# Patient Record
Sex: FEMALE | Race: WHITE | NOT HISPANIC OR LATINO | Employment: OTHER | URBAN - METROPOLITAN AREA
[De-identification: names, ages, dates, MRNs, and addresses within clinical notes are randomized per-mention and may not be internally consistent; named-entity substitution may affect disease eponyms.]

---

## 2023-01-13 ENCOUNTER — TRANSFERRED RECORDS (OUTPATIENT)
Dept: HEALTH INFORMATION MANAGEMENT | Facility: CLINIC | Age: 33
End: 2023-01-13

## 2023-08-07 ENCOUNTER — TRANSFERRED RECORDS (OUTPATIENT)
Dept: HEALTH INFORMATION MANAGEMENT | Facility: CLINIC | Age: 33
End: 2023-08-07

## 2024-04-10 ENCOUNTER — TRANSFERRED RECORDS (OUTPATIENT)
Dept: HEALTH INFORMATION MANAGEMENT | Facility: CLINIC | Age: 34
End: 2024-04-10

## 2024-04-25 ENCOUNTER — TRANSFERRED RECORDS (OUTPATIENT)
Dept: HEALTH INFORMATION MANAGEMENT | Facility: CLINIC | Age: 34
End: 2024-04-25

## 2024-04-30 ENCOUNTER — MEDICAL CORRESPONDENCE (OUTPATIENT)
Dept: HEALTH INFORMATION MANAGEMENT | Facility: CLINIC | Age: 34
End: 2024-04-30

## 2024-05-03 ENCOUNTER — TELEPHONE (OUTPATIENT)
Dept: NEUROLOGY | Facility: CLINIC | Age: 34
End: 2024-05-03

## 2024-05-03 NOTE — TELEPHONE ENCOUNTER
New seizure, lives in [place], DR luis Bob   - c/w Prozac 40 mg qday  - c/w Zyprexa 7.5 mg at bedtime (increased on 11/29)  - c/w Klonopin 0.5 mg only at bedtime for anxiety

## 2024-09-04 ENCOUNTER — TRANSFERRED RECORDS (OUTPATIENT)
Dept: HEALTH INFORMATION MANAGEMENT | Facility: CLINIC | Age: 34
End: 2024-09-04
Payer: COMMERCIAL

## 2024-09-04 ENCOUNTER — OFFICE VISIT (OUTPATIENT)
Dept: NEUROLOGY | Facility: CLINIC | Age: 34
End: 2024-09-04
Payer: COMMERCIAL

## 2024-09-04 ENCOUNTER — ANCILLARY PROCEDURE (OUTPATIENT)
Dept: NEUROLOGY | Facility: CLINIC | Age: 34
End: 2024-09-04
Attending: PSYCHIATRY & NEUROLOGY
Payer: COMMERCIAL

## 2024-09-04 VITALS
HEIGHT: 70 IN | HEART RATE: 76 BPM | TEMPERATURE: 97.3 F | BODY MASS INDEX: 16.69 KG/M2 | DIASTOLIC BLOOD PRESSURE: 66 MMHG | OXYGEN SATURATION: 99 % | WEIGHT: 116.6 LBS | SYSTOLIC BLOOD PRESSURE: 99 MMHG

## 2024-09-04 DIAGNOSIS — R40.4 NONSPECIFIC PAROXYSMAL SPELL: Primary | ICD-10-CM

## 2024-09-04 DIAGNOSIS — G40.909 SEIZURE DISORDER (H): ICD-10-CM

## 2024-09-04 RX ORDER — HYDROXYZINE HYDROCHLORIDE 25 MG/1
25 TABLET, FILM COATED ORAL EVERY 8 HOURS PRN
COMMUNITY

## 2024-09-04 RX ORDER — DIVALPROEX SODIUM 250 MG/1
250 TABLET, DELAYED RELEASE ORAL AT BEDTIME
Status: ON HOLD | COMMUNITY
Start: 2024-06-17 | End: 2024-09-11

## 2024-09-04 RX ORDER — ESZOPICLONE 3 MG/1
3 TABLET, FILM COATED ORAL AT BEDTIME
COMMUNITY

## 2024-09-04 RX ORDER — LACOSAMIDE 50 MG/1
TABLET ORAL
Status: ON HOLD | COMMUNITY
Start: 2024-08-23 | End: 2024-09-10

## 2024-09-04 RX ORDER — VILAZODONE HYDROCHLORIDE 40 MG/1
40 TABLET ORAL
COMMUNITY

## 2024-09-04 RX ORDER — BUSPIRONE HYDROCHLORIDE 15 MG/1
15 TABLET ORAL 2 TIMES DAILY
COMMUNITY

## 2024-09-04 RX ORDER — CLONAZEPAM 0.5 MG/1
0.5 TABLET ORAL
COMMUNITY
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
## 2024-09-04 NOTE — PATIENT INSTRUCTIONS
Continue current medications.    Proceed with MRI today, Education tomorrow, and Inpatient evaluation on Thursday    Follow up with one of our epileptologists after the inpatient stay.    No driving at this time  Avoid any activities that might lead to self-injury or injury of others, within 3 months following any seizure with impaired awareness or impaired motor control such activities include but are not limited to operating power tools, operating firearms, climbing ladders/trees/exposure to heights from which he might fall. Do not operate power tools or heavy machinery and equipment.  Do not swim alone, bathe in any form of tub, such as bathtub, jacuzzi, or hot tub unless there is a responsible adult close by to provide assistance in case she has a seizure and drowns. Avoid work on hot surfaces such stoves, ovens, or with scalding hot water.

## 2024-09-05 ENCOUNTER — ALLIED HEALTH/NURSE VISIT (OUTPATIENT)
Dept: NEUROLOGY | Facility: CLINIC | Age: 34
End: 2024-09-05
Payer: COMMERCIAL

## 2024-09-05 DIAGNOSIS — R56.9 SEIZURE-LIKE ACTIVITY (H): Primary | ICD-10-CM

## 2024-09-05 NOTE — PROGRESS NOTES
Care Coordinator Visit    Name:  Winnie Banks   Date:    2024   :   1990   MRN:  [id number]     Time Spent:  Face-to-face time 90 minutes  See scanned [employer] checklist regarding videos viewed and handouts provided.     Winnie Banks comes into clinic today at the request of Claire Gunn PA-C Ordering and supervising Provider for Patient education    I met with the patient after seizure videos were viewed, as listed in the checklist.     Patient History:      Cecil is a 34 year old female referred by Dr.Jeanne Bob out of [facility] for evaluation of spells that have not been controlled with antiseizure medications.  Patient history includes possible convulsions starting a few weeks following a Gardasil vaccination, and continuing over the next year. She then reports seizure freedom, and no antiseizure medications for many years until a few years ago.  Seizure like events reoccurred, and have not been controlled since. She also reports a diagnosis of POTS, made following a tilt table study, and a diagnosis of Neurocardiogenic Syncope.  She currently lives with her  and their three year old child. 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    The  What Do You Know About Epilepsy  questionnaire was reviewed with the patient.  Areas focused on were what to do if a medication dose is missed.  We also reviewed all questions that were answered incorrectly.    Basic introduction to epilepsy was done, including the difference between epileptic and nonepileptic events (including physiologic and psychogenic nonepileptic events) and the difference between partial onset and generalized onset epileptic seizures.  We discussed appropriate treatment for each of  these and discussed why an accurate diagnosis is important.      We discussed stress and illness and how these can affect seizure control.  Progressive muscle relaxation was introduced and Cecil was provided with printed handouts about PMR    Cecil reported that she and her  would like to have another child, however due to the medications she is on she has been told this is advised against.  We discussed the risk of medications vs the risk of uncontrolled seizures. We talked about the importance of planning any pregnancy.    Introduction to [facility] was performed, including hospital policies, how seizures are induced, hygiene breaks, and the importance of staff assistance whenever  she  gets out of bed.  Winnie is aware that the length of stay is generally five days, but that this is dependent on what is captured on EEG.     During our general education session Winnie asked many appropriate questions, which were answered to  her satisfaction.

## 2024-09-06 ENCOUNTER — HOSPITAL ENCOUNTER (INPATIENT)
Facility: CLINIC | Age: 34
LOS: 5 days | Discharge: HOME | DRG: 101 | End: 2024-09-11
Attending: PSYCHIATRY & NEUROLOGY | Admitting: PSYCHIATRY & NEUROLOGY
Payer: COMMERCIAL

## 2024-09-06 ENCOUNTER — HOSPITAL ENCOUNTER (OUTPATIENT)
Dept: NEUROLOGY | Facility: CLINIC | Age: 34
Discharge: HOME | DRG: 101 | End: 2024-09-06
Attending: PSYCHIATRY & NEUROLOGY | Admitting: PSYCHIATRY & NEUROLOGY
Payer: COMMERCIAL

## 2024-09-06 DIAGNOSIS — R56.9 SEIZURES (H): Primary | ICD-10-CM

## 2024-09-06 LAB
ALBUMIN SERPL BCG-MCNC: 4 G/DL (ref 3.5–5.2)
ALP SERPL-CCNC: 29 U/L (ref 40–150)
ALT SERPL W P-5'-P-CCNC: 6 U/L (ref 0–50)
ANION GAP SERPL CALCULATED.3IONS-SCNC: 7 MMOL/L (ref 7–15)
AST SERPL W P-5'-P-CCNC: 25 U/L (ref 0–45)
BILIRUB SERPL-MCNC: 1.2 MG/DL
BUN SERPL-MCNC: 4.4 MG/DL (ref 6–20)
CALCIUM SERPL-MCNC: 8.8 MG/DL (ref 8.8–10.4)
CHLORIDE SERPL-SCNC: 107 MMOL/L (ref 98–107)
CREAT SERPL-MCNC: 0.79 MG/DL (ref 0.51–0.95)
EGFRCR SERPLBLD CKD-EPI 2021: >90 ML/MIN/1.73M2
ERYTHROCYTE [DISTWIDTH] IN BLOOD BY AUTOMATED COUNT: 13.2 % (ref 10–15)
GLUCOSE SERPL-MCNC: 86 MG/DL (ref 70–99)
HCG INTACT+B SERPL-ACNC: <1 MIU/ML
HCO3 SERPL-SCNC: 26 MMOL/L (ref 22–29)
HCT VFR BLD AUTO: 35.5 % (ref 35–47)
HGB BLD-MCNC: 11.4 G/DL (ref 11.7–15.7)
MCH RBC QN AUTO: 31.3 PG (ref 26.5–33)
MCHC RBC AUTO-ENTMCNC: 32.1 G/DL (ref 31.5–36.5)
MCV RBC AUTO: 98 FL (ref 78–100)
PLATELET # BLD AUTO: 117 10E3/UL (ref 150–450)
POTASSIUM SERPL-SCNC: 4.1 MMOL/L (ref 3.4–5.3)
PROT SERPL-MCNC: 6.1 G/DL (ref 6.4–8.3)
RBC # BLD AUTO: 3.64 10E6/UL (ref 3.8–5.2)
SODIUM SERPL-SCNC: 140 MMOL/L (ref 135–145)
WBC # BLD AUTO: 3.6 10E3/UL (ref 4–11)

## 2024-09-06 PROCEDURE — 99223 1ST HOSP IP/OBS HIGH 75: CPT | Mod: 25 | Performed by: PHYSICIAN ASSISTANT

## 2024-09-06 PROCEDURE — 84702 CHORIONIC GONADOTROPIN TEST: CPT | Performed by: PHYSICIAN ASSISTANT

## 2024-09-06 PROCEDURE — 250N000013 HC RX MED GY IP 250 OP 250 PS 637: Performed by: PHYSICIAN ASSISTANT

## 2024-09-06 PROCEDURE — 36415 COLL VENOUS BLD VENIPUNCTURE: CPT | Performed by: PHYSICIAN ASSISTANT

## 2024-09-06 PROCEDURE — 95714 VEEG EA 12-26 HR UNMNTR: CPT

## 2024-09-06 PROCEDURE — 85049 AUTOMATED PLATELET COUNT: CPT | Performed by: PHYSICIAN ASSISTANT

## 2024-09-06 PROCEDURE — 120N000002 HC R&B MED SURG/OB UMMC

## 2024-09-06 PROCEDURE — 95720 EEG PHY/QHP EA INCR W/VEEG: CPT | Performed by: PSYCHIATRY & NEUROLOGY

## 2024-09-06 PROCEDURE — 80235 DRUG ASSAY LACOSAMIDE: CPT | Performed by: PHYSICIAN ASSISTANT

## 2024-09-06 PROCEDURE — 999N000127 HC STATISTIC PERIPHERAL IV START W US GUIDANCE

## 2024-09-06 PROCEDURE — 80053 COMPREHEN METABOLIC PANEL: CPT | Performed by: PHYSICIAN ASSISTANT

## 2024-09-06 RX ORDER — CLONAZEPAM 0.5 MG/1
0.5 TABLET ORAL
Status: DISCONTINUED | OUTPATIENT
Start: 2024-09-06 | End: 2024-09-11 | Stop reason: HOSPADM

## 2024-09-06 RX ORDER — ESZOPICLONE 1 MG/1
3 TABLET, FILM COATED ORAL AT BEDTIME
Status: DISCONTINUED | OUTPATIENT
Start: 2024-09-06 | End: 2024-09-11 | Stop reason: HOSPADM

## 2024-09-06 RX ORDER — LORAZEPAM 2 MG/ML
2 INJECTION INTRAMUSCULAR
Status: DISCONTINUED | OUTPATIENT
Start: 2024-09-06 | End: 2024-09-11 | Stop reason: HOSPADM

## 2024-09-06 RX ORDER — LACOSAMIDE 200 MG/1
200 TABLET ORAL EVERY EVENING
Status: DISCONTINUED | OUTPATIENT
Start: 2024-09-06 | End: 2024-09-06

## 2024-09-06 RX ORDER — LACOSAMIDE 100 MG/1
100 TABLET ORAL 2 TIMES DAILY
Status: DISCONTINUED | OUTPATIENT
Start: 2024-09-06 | End: 2024-09-08

## 2024-09-06 RX ORDER — BUSPIRONE HYDROCHLORIDE 15 MG/1
15 TABLET ORAL 2 TIMES DAILY
Status: DISCONTINUED | OUTPATIENT
Start: 2024-09-06 | End: 2024-09-11 | Stop reason: HOSPADM

## 2024-09-06 RX ORDER — VILAZODONE HYDROCHLORIDE 40 MG/1
40 TABLET ORAL
Status: DISCONTINUED | OUTPATIENT
Start: 2024-09-06 | End: 2024-09-11 | Stop reason: HOSPADM

## 2024-09-06 RX ORDER — DOCUSATE SODIUM 100 MG/1
100 CAPSULE, LIQUID FILLED ORAL 2 TIMES DAILY PRN
Status: DISCONTINUED | OUTPATIENT
Start: 2024-09-06 | End: 2024-09-11 | Stop reason: HOSPADM

## 2024-09-06 RX ORDER — LIDOCAINE 40 MG/G
CREAM TOPICAL
Status: DISCONTINUED | OUTPATIENT
Start: 2024-09-06 | End: 2024-09-11 | Stop reason: HOSPADM

## 2024-09-06 RX ORDER — LIDOCAINE HYDROCHLORIDE 20 MG/ML
5 SOLUTION OROPHARYNGEAL EVERY 6 HOURS PRN
Status: DISCONTINUED | OUTPATIENT
Start: 2024-09-06 | End: 2024-09-11 | Stop reason: HOSPADM

## 2024-09-06 RX ORDER — DIVALPROEX SODIUM 250 MG/1
250 TABLET, DELAYED RELEASE ORAL AT BEDTIME
Status: DISCONTINUED | OUTPATIENT
Start: 2024-09-06 | End: 2024-09-06

## 2024-09-06 RX ORDER — ACETAMINOPHEN 325 MG/1
650 TABLET ORAL EVERY 4 HOURS PRN
Status: DISCONTINUED | OUTPATIENT
Start: 2024-09-06 | End: 2024-09-11 | Stop reason: HOSPADM

## 2024-09-06 RX ORDER — IBUPROFEN 200 MG
200-600 TABLET ORAL EVERY 6 HOURS PRN
Status: DISCONTINUED | OUTPATIENT
Start: 2024-09-06 | End: 2024-09-11 | Stop reason: HOSPADM

## 2024-09-06 RX ORDER — HYDROXYZINE HYDROCHLORIDE 25 MG/1
25 TABLET, FILM COATED ORAL EVERY 8 HOURS PRN
Status: DISCONTINUED | OUTPATIENT
Start: 2024-09-06 | End: 2024-09-11 | Stop reason: HOSPADM

## 2024-09-06 RX ORDER — GINSENG 100 MG
CAPSULE ORAL 3 TIMES DAILY PRN
Status: DISCONTINUED | OUTPATIENT
Start: 2024-09-06 | End: 2024-09-11 | Stop reason: HOSPADM

## 2024-09-06 RX ADMIN — BUSPIRONE HYDROCHLORIDE 15 MG: 15 TABLET ORAL at 22:07

## 2024-09-06 RX ADMIN — ESZOPICLONE 3 MG: 1 TABLET, FILM COATED ORAL at 22:07

## 2024-09-06 RX ADMIN — LACOSAMIDE 100 MG: 100 TABLET, FILM COATED ORAL at 22:08

## 2024-09-06 RX ADMIN — CLONAZEPAM 0.5 MG: 0.5 TABLET ORAL at 22:13

## 2024-09-06 RX ADMIN — VILAZODONE HYDROCHLORIDE 40 MG: 40 TABLET ORAL at 19:16

## 2024-09-06 ASSESSMENT — ACTIVITIES OF DAILY LIVING (ADL)
ADLS_ACUITY_SCORE: 25
ADLS_ACUITY_SCORE: 25
ADLS_ACUITY_SCORE: 26
ADLS_ACUITY_SCORE: 25
ADLS_ACUITY_SCORE: 25
ADLS_ACUITY_SCORE: 33
ADLS_ACUITY_SCORE: 25
ADLS_ACUITY_SCORE: 25
ADLS_ACUITY_SCORE: 35
ADLS_ACUITY_SCORE: 35
ADLS_ACUITY_SCORE: 25
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
## 2024-09-06 NOTE — PHARMACY-ADMISSION MEDICATION HISTORY
Pharmacist Admission Medication History    Admission medication history is complete. The information provided in this note is only as accurate as the sources available at the time of the update.    Information Source(s): Patient via in-person    Pertinent Information: Patient had all home medication bottles with her.    Changes made to PTA medication list:  Added: None  Deleted: None  Changed: None    Allergies reviewed with patient and updates made in EHR: yes    Medication History Completed By: Bharat Hernandez [facility] 9/6/2024 2:55 PM    PTA Med List   Medication Sig Last Dose    busPIRone (BUSPAR) 15 MG tablet Take 15 mg by mouth 2 times daily. 9/6/2024 at am    clonazePAM (KLONOPIN) 0.5 MG tablet Take 0.5 mg by mouth nightly as needed for sleep or anxiety. 9/5/2024 at hs    divalproex sodium delayed-release (DEPAKOTE) 250 MG DR tablet Take 250 mg by mouth at bedtime. 9/5/2024 at hs    eszopiclone (LUNESTA) 3 MG tablet Take 3 mg by mouth at bedtime. 9/5/2024 at hs    hydrOXYzine HCl (ATARAX) 25 MG tablet Take 25 mg by mouth every 8 hours as needed for anxiety. Pt takes 25mg AM, 50mg PM 9/6/2024 at am    lacosamide (VIMPAT) 50 MG TABS tablet TAKE 3 TABLETS BY MOUTH EVERY MORNING AND 4 IN THE EVENING 9/6/2024 at am    levonorgestrel (MIRENA) 52 MG (20 mcg/day) IUD by Intrauterine route once. Unknown    vilazodone (VIIBRYD) 40 MG TABS tablet Take 40 mg by mouth daily (with dinner). 9/5/2024

## 2024-09-07 ENCOUNTER — APPOINTMENT (OUTPATIENT)
Dept: NEUROLOGY | Facility: CLINIC | Age: 34
DRG: 101 | End: 2024-09-07
Attending: PHYSICIAN ASSISTANT
Payer: COMMERCIAL

## 2024-09-07 PROCEDURE — 250N000013 HC RX MED GY IP 250 OP 250 PS 637: Performed by: PHYSICIAN ASSISTANT

## 2024-09-07 PROCEDURE — 120N000002 HC R&B MED SURG/OB UMMC

## 2024-09-07 PROCEDURE — 95714 VEEG EA 12-26 HR UNMNTR: CPT

## 2024-09-07 PROCEDURE — 95720 EEG PHY/QHP EA INCR W/VEEG: CPT | Performed by: PSYCHIATRY & NEUROLOGY

## 2024-09-07 PROCEDURE — 99231 SBSQ HOSP IP/OBS SF/LOW 25: CPT | Mod: 25 | Performed by: PSYCHIATRY & NEUROLOGY

## 2024-09-07 RX ADMIN — LACOSAMIDE 100 MG: 100 TABLET, FILM COATED ORAL at 10:23

## 2024-09-07 RX ADMIN — DOCUSATE SODIUM 100 MG: 100 CAPSULE, LIQUID FILLED ORAL at 20:10

## 2024-09-07 RX ADMIN — BUSPIRONE HYDROCHLORIDE 15 MG: 15 TABLET ORAL at 20:10

## 2024-09-07 RX ADMIN — DOCUSATE SODIUM 100 MG: 100 CAPSULE, LIQUID FILLED ORAL at 16:42

## 2024-09-07 RX ADMIN — HYDROXYZINE HYDROCHLORIDE 25 MG: 25 TABLET, FILM COATED ORAL at 10:27

## 2024-09-07 RX ADMIN — VILAZODONE HYDROCHLORIDE 40 MG: 40 TABLET ORAL at 20:09

## 2024-09-07 RX ADMIN — BUSPIRONE HYDROCHLORIDE 15 MG: 15 TABLET ORAL at 10:23

## 2024-09-07 RX ADMIN — LACOSAMIDE 100 MG: 100 TABLET, FILM COATED ORAL at 20:10

## 2024-09-07 ASSESSMENT — ACTIVITIES OF DAILY LIVING (ADL)
ADLS_ACUITY_SCORE: 26
ADLS_ACUITY_SCORE: 25
ADLS_ACUITY_SCORE: 30
ADLS_ACUITY_SCORE: 26
ADLS_ACUITY_SCORE: 25
ADLS_ACUITY_SCORE: 26
ADLS_ACUITY_SCORE: 25
ADLS_ACUITY_SCORE: 25
ADLS_ACUITY_SCORE: 26
ADLS_ACUITY_SCORE: 25
ADLS_ACUITY_SCORE: 30
ADLS_ACUITY_SCORE: 30
ADLS_ACUITY_SCORE: 25
ADLS_ACUITY_SCORE: 30
ADLS_ACUITY_SCORE: 25
ADLS_ACUITY_SCORE: 25
ADLS_ACUITY_SCORE: 26
ADLS_ACUITY_SCORE: 25
ADLS_ACUITY_SCORE: 26
ADLS_ACUITY_SCORE: 25

## 2024-09-07 NOTE — PROGRESS NOTES
"Epilepsy Service Progress Note   Interval History:   The patient did not have any seizures or target clinical events overnight.  She denies headache, dizziness, double vision or other complaints.    Physical Exam:   BP (!) 87/58 (BP Location: Right arm)   Pulse 73   Temp 97.4  F (36.3  C) (Oral)   Resp 16   Ht 1.778 m (5' 10\")   Wt 61.6 kg (135 lb 14.4 oz)   SpO2 97%   BMI 19.50 kg/m    Alert, awake, NAD, no aphasia or dysarthria, EOMI, face is symmetric, normal tone, bulk and strength 5/5, normal finger-to-nose.    Home AEDs:   Lacosamide 150-200  Divalproex  hs  Klonopin 0.5 mg hs PRN (takes most nights)   Current AEDs:   Lacosamide 100-100  Klonopin 0.5 mg hs PRN (takes most nights)     Current Facility-Administered Medications   Medication Dose Route Frequency Provider Last Rate Last Admin    acetaminophen (TYLENOL) tablet 650 mg  650 mg Oral Q4H PRN Ni Nicholson PA        bacitracin ointment   Topical TID PRN Ni Nicholson PA        busPIRone (BUSPAR) tablet 15 mg  15 mg Oral BID Ni Nicholson PA   15 mg at 09/06/24 2207    clonazePAM (klonoPIN) tablet 0.5 mg  0.5 mg Oral At Bedtime PRN Ni Nicholson PA   0.5 mg at 09/06/24 2213    docusate sodium (COLACE) capsule 100 mg  100 mg Oral BID PRN Ni Nicholson PA        eszopiclone (LUNESTA) tablet 3 mg  3 mg Oral At Bedtime Ni Nicholson PA   3 mg at 09/06/24 2207    hydrOXYzine HCl (ATARAX) tablet 25 mg  25 mg Oral Q8H PRN Ni Nicholson PA        ibuprofen (ADVIL/MOTRIN) tablet 200-600 mg  200-600 mg Oral Q6H PRN Ni Nicholson PA        Lacosamide (VIMPAT) tablet 100 mg  100 mg Oral BID Ni Nicholson PA   100 mg at 09/06/24 2208    lidocaine (LMX4) cream   Topical Q1H PRN Ni Nicholson PA        lidocaine (viscous) (XYLOCAINE) 2 % solution 5 mL  5 mL Mouth/Throat Q6H PRN Ni Nicholson PA        " lidocaine 1 % 0.1-1 mL  0.1-1 mL Other Q1H PRN Ni Nicholson PA        LORazepam (ATIVAN) injection 2 mg  2 mg Intravenous Q2H PRN Ni Nicholson PA        magnesium hydroxide (MILK OF MAGNESIA) suspension 30 mL  30 mL Oral Daily PRN Ni Nicholson PA        sodium chloride (PF) 0.9% PF flush 3 mL  3 mL Intracatheter Q8H iN Nicholson PA   3 mL at 09/06/24 2322    sodium chloride (PF) 0.9% PF flush 3 mL  3 mL Intracatheter q1 min prn Ni Nicholson PA        vilazodone (VIIBRYD) tablet 40 mg  40 mg Oral Daily with supper Ni Nicholson PA   40 mg at 09/06/24 1916     Assessment:      Winnie Banks is a 34 year old right-handed female with a history of POTS and seizure-like spells who is being admitted for video EEG monitoring for further characterization.      Plan:   - sleep-deprive for 4 hrs tonight  - Continue ASDs as before  - seizure precautions  - ativan PRN seizures per protocol  - SCD's for DVT prophylaxis      Farheen Mathew MD

## 2024-09-07 NOTE — PLAN OF CARE
Goal Outcome Evaluation:      Plan of Care Reviewed With: patient    Overall Patient Progress: no changeOverall Patient Progress: no change    Outcome Evaluation: No events this shift, will be sleep deprived tonight    Vitals: Soft BP- Asymptomatic   Neuros: Intact ex intermittent blurry/double vision that is baseline   IV: PIV SL  Resp/trach: WNL  Diet: regular diet  Bowel status: BM PTA   : Voiding spontaneously   Skin: VEEG leads in place.  Pain: denies  Activity: SBA/GB.   Plan: Pt will be sleep deprived tonight.     Arrived from: Home   Belongings/meds: Meds in security. 2 suitcases, purse, glasses, phone, , clothes, shoes, makeup bag, food/drinks   2 RN Skin Assessment Completed by: Needs to be done, pt agreed next time she is up to bathroom

## 2024-09-07 NOTE — PLAN OF CARE
Goal Outcome Evaluation:      Plan of Care Reviewed With: patient    Overall Patient Progress: no changeOverall Patient Progress: no change    Outcome Evaluation: no events this shift. PRN klonopin given for anxiety and sleep aid        Status: Admitted for VEEG for spell characterization. Hx of IBS, POTS.   Vitals: VSS on RA ex soft Bps @ baseline. On continuous pulse ox overnight.   Neuros: Intact ex intermittent blurry/double vision per pt, stated this is not new.  IV: PIV SL  Resp/trach: on RA. Denies SOB.  Diet: regular diet  Bowel status: BS+, no BM this shift. LBM PTA.  : VDSP  Skin: VEEG leads in place.  Pain: denies  Activity: SBA/GB. No events this shift.   Social: patient on phone while awake, resting in between cares overnight. PRN klonopin given to aid with anxiety and sleep.  Plan: on decreased lacosamide, discontinued PTA divalproex. Continue to monitor for events.     Type 1: Smaller spells.May feel itchy prior. Sometimes has a feeling something bad is going to happen. This feeling may be 30 minutes to 2 hours prior. She will feel like she is tumbling through open air. Will have a whole body jerk intermittently throughout. She is fully aware. They always occur in evening. Happening 3-4 times a week, can cluster. After she is tired, sore and feels cognitively slow and has trouble talking. The next morning and into the day she has trouble waking up and when she does become aware, she can't move, can't talk and feels paralyzed. Because of this she has been incontinent on 3 occasions.      Type 2: Convulsive spells. She had no warning,  notes eyes roll back/flutter and she has whole body shaking. No incontinence or tongue biting. After she is confused, tired and has a headache. Has had 4 of these since 11/2022.      Type 3: She had one event in which she saw rainbow kaleidoscopes for over one hour in visual field.

## 2024-09-08 ENCOUNTER — APPOINTMENT (OUTPATIENT)
Dept: NEUROLOGY | Facility: CLINIC | Age: 34
DRG: 101 | End: 2024-09-08
Attending: PHYSICIAN ASSISTANT
Payer: COMMERCIAL

## 2024-09-08 PROCEDURE — 99231 SBSQ HOSP IP/OBS SF/LOW 25: CPT | Mod: 25 | Performed by: PSYCHIATRY & NEUROLOGY

## 2024-09-08 PROCEDURE — 95720 EEG PHY/QHP EA INCR W/VEEG: CPT | Performed by: PSYCHIATRY & NEUROLOGY

## 2024-09-08 PROCEDURE — 120N000002 HC R&B MED SURG/OB UMMC

## 2024-09-08 PROCEDURE — 95714 VEEG EA 12-26 HR UNMNTR: CPT

## 2024-09-08 PROCEDURE — 250N000013 HC RX MED GY IP 250 OP 250 PS 637: Performed by: PHYSICIAN ASSISTANT

## 2024-09-08 RX ADMIN — DOCUSATE SODIUM 100 MG: 100 CAPSULE, LIQUID FILLED ORAL at 19:38

## 2024-09-08 RX ADMIN — LACOSAMIDE 100 MG: 100 TABLET, FILM COATED ORAL at 08:31

## 2024-09-08 RX ADMIN — CLONAZEPAM 0.5 MG: 0.5 TABLET ORAL at 22:10

## 2024-09-08 RX ADMIN — ESZOPICLONE 3 MG: 1 TABLET, FILM COATED ORAL at 22:10

## 2024-09-08 RX ADMIN — BUSPIRONE HYDROCHLORIDE 15 MG: 15 TABLET ORAL at 08:31

## 2024-09-08 RX ADMIN — BUSPIRONE HYDROCHLORIDE 15 MG: 15 TABLET ORAL at 19:38

## 2024-09-08 RX ADMIN — IBUPROFEN 400 MG: 200 TABLET, FILM COATED ORAL at 17:04

## 2024-09-08 RX ADMIN — HYDROXYZINE HYDROCHLORIDE 25 MG: 25 TABLET, FILM COATED ORAL at 08:31

## 2024-09-08 RX ADMIN — ESZOPICLONE 3 MG: 1 TABLET, FILM COATED ORAL at 01:59

## 2024-09-08 RX ADMIN — VILAZODONE HYDROCHLORIDE 40 MG: 40 TABLET ORAL at 19:38

## 2024-09-08 ASSESSMENT — ACTIVITIES OF DAILY LIVING (ADL)
ADLS_ACUITY_SCORE: 25
ADLS_ACUITY_SCORE: 26
ADLS_ACUITY_SCORE: 25
ADLS_ACUITY_SCORE: 26
ADLS_ACUITY_SCORE: 25

## 2024-09-08 NOTE — PLAN OF CARE
Status: Admitted 9/6 for vEE monitoring. No events witnessed/reported this shift  Vitals: VSS ex soft BPs  Neuros: A&O x4. Denies N/T, intermittent blurry vision at baseline. 5/5 strengths throughout  IV: PIV SL  Diet: Regular, denies nausea  Bowel status: constipated, had a small BM 9/7, passing gas  : voiding spontaneously   Skin: EEG leads intact  Pain: Denies  Activity: SBA/GB  Plan/updates: Off of depakote, vimpat dose decreased. Sleep deprived for 4 hours, awake until 2200 today, no naps.

## 2024-09-08 NOTE — PROGRESS NOTES
"Epilepsy Service Progress Note   Interval History:   The patient was sleep deprived last night.  She had an event this morning following hyperventilation with feeling she was going to fall over and had a brief body jerk.  Also when she was face timing with her , her vision got blurry and her  noted her eyes were moving back-and-forth and she seemed \"off\".  She also reports that she had 2 events last night when she was walking with the nurse.  She felt she was going to fall.    Physical Exam:   BP 96/63   Pulse 67   Temp 97.7  F (36.5  C) (Oral)   Resp 16   Ht 1.778 m (5' 10\")   Wt 61.6 kg (135 lb 14.4 oz)   SpO2 100%   BMI 19.50 kg/m    Alert, awake, NAD, no aphasia or dysarthria, EOMI, face is symmetric, normal tone, bulk and strength 5/5, normal finger-to-nose.     Home AEDs: Lacosamide 150-200  Divalproex  hs  Klonopin 0.5 mg hs PRN (takes most nights)   Current AEDs:   Lacosamide 100-100  Klonopin 0.5 mg hs PRN    Current Facility-Administered Medications   Medication Dose Route Frequency Provider Last Rate Last Admin    acetaminophen (TYLENOL) tablet 650 mg  650 mg Oral Q4H PRN Ni Nicholson PA        bacitracin ointment   Topical TID PRN Ni Nicholson PA        busPIRone (BUSPAR) tablet 15 mg  15 mg Oral BID Ni Nicholson PA   15 mg at 09/08/24 0831    clonazePAM (klonoPIN) tablet 0.5 mg  0.5 mg Oral At Bedtime PRN Ni Nicholson PA   0.5 mg at 09/06/24 2213    docusate sodium (COLACE) capsule 100 mg  100 mg Oral BID PRN Ni Nicholson PA   100 mg at 09/07/24 2010    eszopiclone (LUNESTA) tablet 3 mg  3 mg Oral At Bedtime Ni Nicholson PA   3 mg at 09/08/24 0159    hydrOXYzine HCl (ATARAX) tablet 25 mg  25 mg Oral Q8H PRN Ni Nicholson PA   25 mg at 09/08/24 0831    ibuprofen (ADVIL/MOTRIN) tablet 200-600 mg  200-600 mg Oral Q6H PRN Ni Nicholson PA        lidocaine (LMX4) " cream   Topical Q1H PRN Ni Nicholson PA        lidocaine (viscous) (XYLOCAINE) 2 % solution 5 mL  5 mL Mouth/Throat Q6H PRN Ni Nicholson PA        lidocaine 1 % 0.1-1 mL  0.1-1 mL Other Q1H PRN Ni Nicholson PA        LORazepam (ATIVAN) injection 2 mg  2 mg Intravenous Q2H PRN Ni Nicholson PA        magnesium hydroxide (MILK OF MAGNESIA) suspension 30 mL  30 mL Oral Daily PRN Ni Nicholson PA        sodium chloride (PF) 0.9% PF flush 3 mL  3 mL Intracatheter Q8H Ni Nicholson PA   3 mL at 09/08/24 0831    sodium chloride (PF) 0.9% PF flush 3 mL  3 mL Intracatheter q1 min prn Ni Nicholson PA        vilazodone (VIIBRYD) tablet 40 mg  40 mg Oral Daily with supper Ni Nicholson PA   40 mg at 09/07/24 2009     EEG findings: Normal.  No epileptiform discharges or electrographic seizures were recorded.  EEG was reviewed during the reported clinical events.  There were no epileptiform discharges or ictal activities.    Assessment:      Winnie Banks is a 34 year oldright-handed female with a history of POTS and seizure-like spells who is being admitted for video EEG monitoring for further characterization.  The patient had 3 events, which she reports as typical but mild.  She felt she was going to fall and had a body jerk.  She also had an event of blurry vision and eyes moving back-and-forth.  These events did not have an abnormal EEG correlate.  Will discontinue lacosamide tonight.     Plan:     -Continue video EEG monitoring to capture target spells  -Discontinue lacosamide  - seizure precautions  - ativan PRN seizures per protocol  - SCD's for DVT prophylaxis      Farheen Mathew MD

## 2024-09-08 NOTE — PROVIDER NOTIFICATION
Dr Darryl chaudhry -    6A - Cecil Banks - ADDIS pt pushed button around 1038 - Was facetiming  and he noticed she was having visual changes. Eyes shaking back and forth. Lasted about 30 seconds. VSS. Neuros unchanged.     [place] [id number] [phone]

## 2024-09-08 NOTE — PLAN OF CARE
Status: Admitted 9/6 for vEE monitoring. No events witnessed/reported this shift  Vitals: VSS ex soft BPs  Neuros: Intact  IV: PIV SL  Diet: Regular, good PO  Bowel status: Small BM this evening. Took PRN PTA colace  : Voiding  Skin: intact  Pain: Denies  Activity: SBA, GB  Social: on phone with her family throughout shift  Plan/updates: Off of depakote, vimpat dose decreased. Sleep deprive tonight, OK to sleep [id number] 9/8, awake until 2200 9/8

## 2024-09-08 NOTE — PROVIDER NOTIFICATION
"Status: Admitted 9/6 for vEEG monitoring. Multiple events this shift  Vitals: VSS   Neuros: Intact  IV: PIV SL  Diet: Regular, good PO  Bowel status: Small BM 9/7. Took PRN PTA colace  : Voiding  Skin: intact  Pain: Denies  Activity: SBA, GB  Social: on phone with her family throughout shift  Plan/updates: Off of depakote and vimpat. OK to sleep at 2200 9/8. Pt pressed button 6-8 times from [id number] for feelings of \"falling forward, whole body shake\" (type 1) lasting less than a few seconds. Per EEG tech who spoke w/ Dr. Andrews, pt does not need to click button for these. Explained to pt to press the button if her other types occur or if the type 1 is worsening.       [id number] paged @ [phone]  ford vuong [phone] - pt has had 2 type 1 events in last 15 minutes (1724,1731). Lasts for a couple seconds, has a feeling of falling forward/whole body switch. VSS neuros unchanged. thx Alba Andrews paged @ [phone]  ford vuong - neli paged general neuro about pt but they never got sign out. Pt has had 3-4 type 1 events in the last 30 minutes. VSS, neuros unchanged. Alba navarro [phone]      "

## 2024-09-08 NOTE — PLAN OF CARE
Vitals: Soft BP- Asymptomatic   Neuros: Intact ex intermittent blurry/double vision that is baseline, EEG leads in place. Had 3 events today that consisted of visual changes and the feel of falling.   IV: PIV SL  Resp/trach: WNL  Diet: regular diet  Bowel status: BM 9/7  : Voiding spontaneously   Skin: Intact  Pain: denies  Activity: SBA/GB. Walked halls with staff, declined sitting in recliner.   Plan: Was sleep deprived last night - stay awake today. Lacosamide DCd

## 2024-09-09 ENCOUNTER — APPOINTMENT (OUTPATIENT)
Dept: NEUROLOGY | Facility: CLINIC | Age: 34
DRG: 101 | End: 2024-09-09
Attending: PHYSICIAN ASSISTANT
Payer: COMMERCIAL

## 2024-09-09 LAB — PLATELET # BLD AUTO: 124 10E3/UL (ref 150–450)

## 2024-09-09 PROCEDURE — 36415 COLL VENOUS BLD VENIPUNCTURE: CPT | Performed by: PHYSICIAN ASSISTANT

## 2024-09-09 PROCEDURE — 250N000013 HC RX MED GY IP 250 OP 250 PS 637: Performed by: PHYSICIAN ASSISTANT

## 2024-09-09 PROCEDURE — 120N000002 HC R&B MED SURG/OB UMMC

## 2024-09-09 PROCEDURE — 99232 SBSQ HOSP IP/OBS MODERATE 35: CPT | Mod: 25 | Performed by: PHYSICIAN ASSISTANT

## 2024-09-09 PROCEDURE — 85049 AUTOMATED PLATELET COUNT: CPT | Performed by: PHYSICIAN ASSISTANT

## 2024-09-09 PROCEDURE — 95714 VEEG EA 12-26 HR UNMNTR: CPT

## 2024-09-09 PROCEDURE — 95720 EEG PHY/QHP EA INCR W/VEEG: CPT | Performed by: PSYCHIATRY & NEUROLOGY

## 2024-09-09 RX ADMIN — CLONAZEPAM 0.5 MG: 0.5 TABLET ORAL at 22:50

## 2024-09-09 RX ADMIN — HYDROXYZINE HYDROCHLORIDE 25 MG: 25 TABLET, FILM COATED ORAL at 09:15

## 2024-09-09 RX ADMIN — BUSPIRONE HYDROCHLORIDE 15 MG: 15 TABLET ORAL at 21:49

## 2024-09-09 RX ADMIN — HYDROXYZINE HYDROCHLORIDE 25 MG: 25 TABLET, FILM COATED ORAL at 22:50

## 2024-09-09 RX ADMIN — IBUPROFEN 400 MG: 200 TABLET, FILM COATED ORAL at 16:00

## 2024-09-09 RX ADMIN — BUSPIRONE HYDROCHLORIDE 15 MG: 15 TABLET ORAL at 09:15

## 2024-09-09 RX ADMIN — ESZOPICLONE 3 MG: 1 TABLET, FILM COATED ORAL at 22:46

## 2024-09-09 RX ADMIN — VILAZODONE HYDROCHLORIDE 40 MG: 40 TABLET ORAL at 21:49

## 2024-09-09 ASSESSMENT — ACTIVITIES OF DAILY LIVING (ADL)
ADLS_ACUITY_SCORE: 25

## 2024-09-09 NOTE — PLAN OF CARE
[id number]  Diagnosis: EEG video study      Neuro: A&O x4, able to make needs known.   Cardiac: Denied chest pain, dizziness, palpitations. VSS, soft BP. Afebrile.   Respiratory: RA   GI/: WDL.   Diet/Appetite: Regular diet  Skin: No new deficits noted.   LDA: Right PIV SL.   Activity: SBA.  Pain:Denies pain. Appears comfortable. Sleeping in between cares.   Plan: Continue with POC. Notify primary team of pertinent changes.    Shift changes: Pt reported numbness/tingling all over body, per patient it is new. She described it as restless leg syndrome but all over the body. Vitals stable, neuro intact.

## 2024-09-09 NOTE — PLAN OF CARE
Vitals: Soft BP- Asymptomatic   Neuros: Intact. EEG leads in place    IV: PIV SL  Resp/trach: WNL  Diet: regular diet  Bowel status: BM 9/7  : Voiding spontaneously   Skin: Intact  Pain: denies  Activity: SBA/GB.   Plan: Unsure of plan at this time.  currently rounding in room

## 2024-09-09 NOTE — PROGRESS NOTES
"[facility], [place]   Epilepsy Service Daily Note      Interval History:   Multiple type 1 spells without EEG correlate to show electrographic seizure. Didn't sleep well last night. Woke up in middle of night feeling tingling and shaky.     Review of System:   No nausea, No vomiting, no headaches, no dizziness, no chest pain.     Medications:   Antiepileptic Medications Home Doses: lacosamide 150-200, divalproex 250 hs  Antiepileptic Medications Current Doses:  lacosamide dc'd, divalproex dc'd    Exam: Blood pressure 90/60, pulse 68, temperature 98.1  F (36.7  C), temperature source Oral, resp. rate 14, height 1.778 m (5' 10\"), weight 61.6 kg (135 lb 14.4 oz), SpO2 100%.  General: NAD  Head: NC/AT  Extremities: no LE edema   Neuro: Alert and oriented. Speech fluent. Hearing intact to normal conversation. PERRL, EOM's intact. Strong shoulder shrug bilaterally. Face symmetric, tongue midline. Strength 5/5 bilaterally. Sensation intact to light touch bilaterally.   EEG: no electrographic seizures or epileptiform discharges   Labs:    Latest Reference Range & Units Most Recent   Platelet Count 150 - 450 10e3/uL 117 (L)  9/6/24 14:55     Assessment and Plan: patient seen and discussed with Dr. Rajinder Mosley.Patient is a 34 year old, right-handed female with a history of POTS and seizure-like spells who is being admitted for video EEG monitoring for further characterization. Thus far type 1 spells not showing electrographic seizure on EEG. No epileptiform discharges on EEG thus far. Would like to record convulsive spell.     -continue vEEG monitoring  -continue off lacosamide and divalproex  -recheck platelets   -no naps today, stay up until 10 pm  -flashing lights daily (preferably 4pm or later)    Ni Nicholson PA-C    Type of target event identified: event with no loss of awareness and convulsion   Number of events: more needed  Discharge medication plan: To be decided  Further Imaging " studies needed prior to discharge: No imaging required prior to discharge  Discharge transportation: not discussed  Other pertinent issues/goals for discharge: no      Total time: 35 minute was spent in the care of this patient. The patient agrees with the above mentioned plan of care. I answered all the patient's questions and addressed immediate concerns. More than 50% of time spent consisted of counseling and coordinating care, including discussion of the diagnostic significance of EEG findings, anti-seizure medication management, and planning for discharge home

## 2024-09-10 ENCOUNTER — APPOINTMENT (OUTPATIENT)
Dept: NEUROLOGY | Facility: CLINIC | Age: 34
DRG: 101 | End: 2024-09-10
Attending: PHYSICIAN ASSISTANT
Payer: COMMERCIAL

## 2024-09-10 LAB — LACOSAMIDE SERPL-MCNC: 10.2 UG/ML

## 2024-09-10 PROCEDURE — 120N000002 HC R&B MED SURG/OB UMMC

## 2024-09-10 PROCEDURE — 250N000009 HC RX 250: Performed by: PHYSICIAN ASSISTANT

## 2024-09-10 PROCEDURE — 250N000011 HC RX IP 250 OP 636: Performed by: PHYSICIAN ASSISTANT

## 2024-09-10 PROCEDURE — 95714 VEEG EA 12-26 HR UNMNTR: CPT

## 2024-09-10 PROCEDURE — 95720 EEG PHY/QHP EA INCR W/VEEG: CPT | Performed by: PSYCHIATRY & NEUROLOGY

## 2024-09-10 PROCEDURE — C9254 INJECTION, LACOSAMIDE: HCPCS | Performed by: PHYSICIAN ASSISTANT

## 2024-09-10 PROCEDURE — 258N000003 HC RX IP 258 OP 636: Performed by: PHYSICIAN ASSISTANT

## 2024-09-10 PROCEDURE — 250N000013 HC RX MED GY IP 250 OP 250 PS 637: Performed by: PHYSICIAN ASSISTANT

## 2024-09-10 PROCEDURE — 99232 SBSQ HOSP IP/OBS MODERATE 35: CPT | Mod: 25 | Performed by: PHYSICIAN ASSISTANT

## 2024-09-10 RX ORDER — LACOSAMIDE 200 MG/1
200 TABLET ORAL 2 TIMES DAILY
Status: DISCONTINUED | OUTPATIENT
Start: 2024-09-10 | End: 2024-09-11 | Stop reason: HOSPADM

## 2024-09-10 RX ADMIN — LACOSAMIDE 200 MG: 200 TABLET, FILM COATED ORAL at 19:55

## 2024-09-10 RX ADMIN — BUSPIRONE HYDROCHLORIDE 15 MG: 15 TABLET ORAL at 22:51

## 2024-09-10 RX ADMIN — BACITRACIN: 500 OINTMENT TOPICAL at 18:35

## 2024-09-10 RX ADMIN — ESZOPICLONE 3 MG: 1 TABLET, FILM COATED ORAL at 22:51

## 2024-09-10 RX ADMIN — HYDROXYZINE HYDROCHLORIDE 25 MG: 25 TABLET, FILM COATED ORAL at 22:52

## 2024-09-10 RX ADMIN — HYDROXYZINE HYDROCHLORIDE 25 MG: 25 TABLET, FILM COATED ORAL at 09:56

## 2024-09-10 RX ADMIN — SODIUM CHLORIDE 300 MG: 9 INJECTION, SOLUTION INTRAVENOUS at 16:21

## 2024-09-10 RX ADMIN — IBUPROFEN 400 MG: 200 TABLET, FILM COATED ORAL at 09:44

## 2024-09-10 RX ADMIN — BUSPIRONE HYDROCHLORIDE 15 MG: 15 TABLET ORAL at 09:45

## 2024-09-10 RX ADMIN — CLONAZEPAM 0.5 MG: 0.5 TABLET ORAL at 22:52

## 2024-09-10 RX ADMIN — VILAZODONE HYDROCHLORIDE 40 MG: 40 TABLET ORAL at 22:51

## 2024-09-10 ASSESSMENT — ACTIVITIES OF DAILY LIVING (ADL)
ADLS_ACUITY_SCORE: 25

## 2024-09-10 NOTE — PLAN OF CARE
"Status: Admitted 9/6 for vEE monitoring  Vitals: VSS   Neuros: Intact  IV: PIV SL  Diet: Regular, good PO  Bowel status: Small BM 9/8  : Voiding  Skin: intact  Pain: Denies  Activity: SBA, GB  Social: on phone with her family throughout shift  Plan/updates: Off of depakote/vimpat. Flashing lights daily     Pt overwhelmed, was told by MD that the events she has had thus far showed no EEG abnormality. Pt states, \"I did not come here for a diagnoses, I already have a diagnoses, I came here for treatment.\" Pt feels she may be having focal seizures that may not show up on EEG or some other medical issue besides something \"trauma induced\". Pt concerned about having one of her paralysis events and having staff use vigorous stimuli and being \"unable to react\" but be able to feel what's happening. Writer explained that in this situation she may respond but if not, staff would not continue to do it.    "

## 2024-09-10 NOTE — DISCHARGE INSTRUCTIONS
START LAMOTRIGINE   Lamotrigine IR Script: Week 1-2: lamotrigine 12.5 mg am, week 3-4: 25 mg am, week 5: 25 mg am and 25 mg pm. Week 6: 50 mg am and 25 mg pm, week 7: 50 mg am and 50 mg pm, week 8: 75 mg am and 50 mg pm. week 9: 75 mg am and 75 mg pm. Week 10: 100 mg am and 75 mg pm. Week 11: 100 mg am and 100 mg pm.                                    Times of Days               Medication Tablet Size Number of Tablets/Capsules Notes       Lamotrigine      7 AM after breakfast  (Morning)   6 PM after dinner  (Night)        Week 1-2      12.5 mg  (1/2 tablet)      0 mg         Week 3-4       25 mg  (1 tablet)    0 mg        Week 5      25 mg   (1 tablets)   25 mg   (1 tablet)        Week 6      50 mg   (2 tablets)   25 mg   (1 tablets)        Week 7     50 mg   (2 tablets)   50 mg   (2 tablets)        Week 8      75 mg   (3 tablets)   50 mg   (2 tablets)       Week  9     75 mg   (3 tablets)   75 mg   (3 tablets)       Week 10     100 mg   (4 tablets)   75 mg   (3 tablets)       Week 11     100 mg   (4 tablets)   100 mg   (4 tablets)          Monitor for side effects, especially rash (such as Vinay Earnest rash which can be life threatening) and mood changes, if any concerns please call our office. [phone]. Call or seek immediate medical attention if a rash develops

## 2024-09-10 NOTE — PROGRESS NOTES
"[facility], [place]   Epilepsy Service Daily Note      Interval History:   Multiple type 1 spells without EEG correlate to show electrographic seizure. Slept well last night. Wonders if it is worth staying longer as she has had her most intense type 1 spells and she feels it is unlikely that she will have a convulsive spell.    Review of System:   No nausea, No vomiting, no headaches, no dizziness, no chest pain.     Medications:   Antiepileptic Medications Home Doses: lacosamide 150-200, divalproex 250 hs  Antiepileptic Medications Current Doses:  lacosamide dc'd, divalproex dc'd    Exam: Blood pressure 90/58, pulse 66, temperature 97.5  F (36.4  C), temperature source Oral, resp. rate 16, height 1.778 m (5' 10\"), weight 61.6 kg (135 lb 14.4 oz), SpO2 100%.  General: NAD  Head: NC/AT  Extremities: no LE edema   Neuro: Alert and oriented. Speech fluent. Hearing intact to normal conversation. PERRL, EOM's intact. Strong shoulder shrug bilaterally. Face symmetric, tongue midline. Strength 5/5 bilaterally. Sensation intact to light touch bilaterally.   EEG: no electrographic seizures or epileptiform discharges   Labs:   Latest Reference Range & Units 09/09/24 15:22   Platelet Count 150 - 450 10e3/uL 124 (L)     Assessment and Plan: patient seen and discussed with Dr. Rajinder Mosley.Patient is a 34 year old, right-handed female with a history of POTS and seizure-like spells who is being admitted for video EEG monitoring for further characterization. Thus far type 1 spells not showing electrographic seizure on EEG. No epileptiform discharges on EEG thus far. Would like to record convulsive spell however she has only had 4 since 11/2022 and feels it is unlikely this will happen. She is frustrated spells have not shown on EEG. We discussed possibility spells may be nonepileptic. Nonepileptic spells may be related to stress, her POTS, atypical/acephalic migraines or possibly vertigo or even cardiac " causes. We discussed approaching treatment from all angles can be beneficial. Encouraged her to continue to focus on mental health and stress management, her POTS treatment and could consider discussing possibility of acephalic migraines or even vertigo/trying vestibular PT if no improvement with antiepileptic drugs. Also discussed ruling out cardiac causes with Holter monitor.     -continue vEEG monitoring  -lacosamide 300 mg IV once  -restart lacosamide at 200-200 tonight  -start lamotrigine 12.5 mg daily   -plan for discharge tomorrow  -patient requests leads off tonight secondary to sores on head         Ni Nicholson PA-C    Type of target event identified: event with no loss of awareness and convulsion   Number of events: more needed  Discharge medication plan: To be decided  Further Imaging studies needed prior to discharge: No imaging required prior to discharge  Discharge transportation: not discussed  Other pertinent issues/goals for discharge: no      Total time: 35 minute was spent in the care of this patient. The patient agrees with the above mentioned plan of care. I answered all the patient's questions and addressed immediate concerns. More than 50% of time spent consisted of counseling and coordinating care, including discussion of the diagnostic significance of EEG findings, anti-seizure medication management, and planning for discharge home

## 2024-09-10 NOTE — PLAN OF CARE
Status: Admitted 9/6 for vEEG for further characterization of spells.  Hx of POTs and PPD.   Vitals: Soft Bps which is her normal. HR good. On Ra.    Neuros: intact.   IV: SL.   Labs/Electrolytes: No updated labs.  Resp/trach: On Ra. LSC.   Diet: Reg good intake.   Bowel status: LBM 9/9 passing gas.   : Voids spont.   Skin: 2x EEG spots wit iritation, declined bacitracin use.   Pain: Mild HA controled with PRN Advil.   Activity: SBA GB. Declined walk, tired today.   Social: Updating family friends themselves.   Plan: Home 9/11. Back on PTA meds.   Updates this shift: No events witnessed or reported

## 2024-09-10 NOTE — PLAN OF CARE
VSS on RA, soft BP at baseline.  Denied pain.  Neuros intact.  VEEG leads in place; no events witnessed or reported.  PIV SL.  On a regular diet.  Voiding spontaneously.  Last BM 9/9.  Up SBA and GB.  Off PTA Vimpat and Depakote.  Sores present from EEG leads on L side of head and forehead; pt declined Bacitracin.  Continue with POC.      Goal Outcome Evaluation:      Plan of Care Reviewed With: patient    Overall Patient Progress: no change    Outcome Evaluation: EEG ongoing

## 2024-09-11 VITALS
BODY MASS INDEX: 19.45 KG/M2 | TEMPERATURE: 98.3 F | SYSTOLIC BLOOD PRESSURE: 93 MMHG | HEIGHT: 70 IN | OXYGEN SATURATION: 100 % | RESPIRATION RATE: 16 BRPM | HEART RATE: 75 BPM | DIASTOLIC BLOOD PRESSURE: 57 MMHG | WEIGHT: 135.9 LBS

## 2024-09-11 PROCEDURE — 99239 HOSP IP/OBS DSCHRG MGMT >30: CPT | Performed by: PHYSICIAN ASSISTANT

## 2024-09-11 PROCEDURE — 250N000013 HC RX MED GY IP 250 OP 250 PS 637: Performed by: PHYSICIAN ASSISTANT

## 2024-09-11 RX ORDER — LAMOTRIGINE 25 MG/1
TABLET ORAL
Qty: 240 TABLET | Refills: 0 | Status: SHIPPED | OUTPATIENT
Start: 2024-09-11

## 2024-09-11 RX ORDER — LACOSAMIDE 50 MG/1
200 TABLET ORAL 2 TIMES DAILY
Qty: 240 TABLET | Refills: 1 | Status: SHIPPED | OUTPATIENT
Start: 2024-09-11

## 2024-09-11 RX ADMIN — LACOSAMIDE 200 MG: 200 TABLET, FILM COATED ORAL at 08:01

## 2024-09-11 RX ADMIN — BUSPIRONE HYDROCHLORIDE 15 MG: 15 TABLET ORAL at 08:01

## 2024-09-11 RX ADMIN — Medication 12.5 MG: at 08:01

## 2024-09-11 ASSESSMENT — ACTIVITIES OF DAILY LIVING (ADL)
ADLS_ACUITY_SCORE: 25

## 2024-09-11 NOTE — DISCHARGE SUMMARY
"[facility]  EPILEPSY SERVICE DISCHARGE SUMMARY    Patient Name:  Winnie Banks  MRN:  [id number]      :  1990      Date of Admission:  2024  Date of Discharge::  2024  3:22 PM  Admitting Physician:  Lowell Gomes MD  Discharge Physician:  JI Rodriguez,   Primary Care Provider:   Mendy Bob  Discharge Disposition:   Discharged to home    Admission Diagnoses:  Seizure-like spells  POTS (dx with tilt table at [place])  IBS  Insomnia  History of postpartum depression, postpartum anxiety and postpartum psychosis     Discharge Diagnoses:    Seizure-like spells  POTS (dx with tilt table at [place])  IBS  Insomnia  History of postpartum depression, postpartum anxiety and postpartum psychosis     Brief History of Illness:   Patient is a 34 year old, right-handed female with a history of POTS and seizure-like spells who is being admitted for video EEG monitoring for further characterization. She reports had her first seizure-like activity was between 2009-2009. The first occurred about 2 weeks after she received her 2nd Gardisil shot. The second was 2009 and 3rd 2009. All were the same. No warning. Eyes would roll back. Then would have whole body shaking and fall. No incontinence or tongue biting. After no significant post-ictal symptoms. Never went to ER that she recalls or had these worked up. As best as she recalls this was just attributed to the vaccine and she was never started on antiepileptic drugs.      Then in  she started with her POTS episodes/syncopal spells. This was eventually diagnosed with tilt table test at [place] in 2018 as POTS and neurocardiogenic syncope as best as she recalls. She believes she took midodrine for a while and they wanted her to take a \"steroid\". She currently manages it with diet, increasing salt intake and electrolytes.      Then in 2022 while painting her sons room she fell off a " ladder and her  heard the thud. He turned to find her convulsing. She had no warning,  noted eyes rolled back/fluttering and she had whole body shaking. No incontinence or tongue biting. After she was confused, tired and would have a headache. Since this time she has had smaller events that she is concerned may be seizures as well.     Currently as 2 types of spells:  Type 1: Smaller spells.May feel itchy prior. Sometimes has a feeling something bad is going to happen. This feeling may be 30 minutes to 2 hours prior. She will feel like she is tumbling through open air. Will have a whole body jerk intermittently throughout. She is fully aware. They always occur in evening. Happening 3-4 times a week, can cluster. After she is tired, sore and feels cognitively slow and has trouble talking. The next morning and into the day she has trouble waking up and when she does become aware, she can't move, can't talk and feels paralyzed. Because of this she has been incontinent on 3 occasions.   Type 2: Convulsive spells. She had no warning,  notes eyes roll back/flutter and she has whole body shaking. No incontinence or tongue biting. After she is confused, tired and has a headache. Has had 4 of these since 11/2022.   Type 3: She had one event in which she saw rainbow kaleidoscopes for over one hour in visual field.     Hospital course:  Patient is a 34 year old, right-handed female with a history of POTS and seizure-like spells who is being admitted for video EEG monitoring for further characterization. Her PTA lacosamide was decreased and stopped and divalproex was stopped. Several type 1 spells were recorded and did not show electrographic seizure on EEG. No epileptiform discharges were noted on EEG throughout admission. Would like to record convulsive spell however she has only had 4 since 11/2022 and feels it is unlikely this will happen. She is frustrated spells have not shown on EEG. We discussed  "possibility spells may be nonepileptic. Nonepileptic spells may be related to stress, her POTS, atypical/acephalic migraines or possibly vertigo or even cardiac causes. We discussed approaching treatment from all angles can be beneficial. Encouraged her to continue to focus on mental health and stress management, her POTS treatment and could consider discussing possibility of acephalic migraines or even vertigo/trying vestibular PT if no improvement with antiepileptic drugs. Also discussed ruling out cardiac causes with Holter monitor. She will review this with local neurologist and PCP.     She feels had better control at lacosamide 200-200. Though level was supratherapeutic, she does not recall having side effects. Will plan to start lamotrigine 12.5 mg daily and will provide a schedule to slowly increase to 100-100. As she does this, if side effects develop, she may need to decrease lacosamide some. Some of the more common side effects of lamotrigine discussed including dizziness, unsteadiness, double/blurry vision, tiredness, nausea/vomitng, headaches, insomnia, rash and Vinay Abdulaziz Syndrome. Lamotrigine handout provided at discharge as well.     She does plan to get pregnant in future. Currently has IUD, however. We reviewed importance of planning pregnancy with epileptologist. She voices understanding to this.     Attending Physician (Dr. Calvillo) Summary and Plan:  \"Mrs. Banks 34 year old, right-handed female with a history of POTS and seizure-like spells who is being admitted for video EEG monitoring for further characterization and change in antiseizure medications.       Currently as 2 types of spells:  Type 1: Smaller spells.May feel itchy prior. Sometimes has a feeling something bad is going to happen. This feeling may be 30 minutes to 2 hours prior. She will feel like she is tumbling through open air. Will have a whole body jerk intermittently throughout. She is fully aware. They always occur in " evening. Happening 3-4 times a week, can cluster. After she is tired, sore and feels cognitively slow and has trouble talking. The next morning and into the day she has trouble waking up and when she does become aware, she can't move, can't talk and feels paralyzed. Because of this she has been incontinent on 3 occasions.      Type 2: Convulsive spells. She had no warning,  notes eyes roll back/flutter and she has whole body shaking. No incontinence or tongue biting. After she is confused, tired and has a headache. Has had 4 of these since 11/2022.      Type 3: She had one event in which she saw rainbow kaleidoscopes for over one hour in visual field.      Prior to this admission she was taking lacosamide 150 mg in the morning and 200 mg at night, divalproex  mg at night, Klonopin 0.5 mg as needed.  However she does take Klonopin most nights.  On this hospital admission we weaned her off of lacosamide and divalproex and she did not receive any Klonopin.  During 5 days of video EEG monitoring her EEG essentially remained normal and no electrographic seizures or epileptiform discharges were recorded.  On video EEG day 3 patient had several spells described below.  The spells had no EEG changes to suggest seizure activity.  My clinical impression is that the spells are not seizures nor are they focal unimpaired seizures with scalp EEG being negative based on clinical presentation.  She did describe some vision changes and a sensation of having difficulty thinking.  It is possible she may have acephalic migraines.  It would be helpful also to evaluate for any underlying cardiac etiologies however this is less likely.  On this hospital admission we did not see any evidence for epilepsy.  She does have a history of convulsions and we did not record the specific spell type.  Based on her clinical history of her convulsions these may possibly represent generalized tonic-clonic seizures.  For this reason it is  advisable that she continue on antiseizure medications.     She would like to get pregnant in the future, therefore it is advisable to stop divalproex and substitute with lamotrigine.  We have given her a schedule to make this transition.  In the hospital we did stop divalproex and started her on slow titration of lamotrigine for the outpatient setting.  Our goal is to titrate lamotrigine to 100 mg twice a day and she will follow-up with Dr. Bob.  She was able to tolerate lacosamide 200 mg twice a day with no concerning side effects.  In the past her lacosamide level was 15 and this may have been a peak level based on administration time of lacosamide and checking her blood concentration.  Considering she had no side effects on a level of 15 I recommend optimizing lacosamide.  It is important to highlight combination of lacosamide and lamotrigine may have more side effects of dizziness, nausea, vomiting, unstable gait.  She will have to be monitored for this in the outpatient setting.  Additionally we did review lamotrigine may be associated with Aguilera-Earnest rash, mood changes, and insomnia.  She understood these side effects and is agreeable to starting lamotrigine.  Teratogenicity associated with lamotrigine was reviewed with patient in great detail.  Additionally lacosamide is reported to have low teratogenicity, however this data is not obtained from the large pregnancy databases.     Patient requested to leave by video-EEG day 5 due to personal responsibilities at home.  She was loaded with IV lacosamide and started on low-dose of lamotrigine.  She was encouraged to take riboflavin 400 mg/day and consider taking magnesium 400 mg/day if she does have a migraine variant.  She can discuss this further with Dr. Bob.  She should complete a Holter monitor at home with local physicians.  Also I have encouraged her to work with her therapist more frequently to address any underlying stressors in the event  "some of her spells are nonepileptic events.        SUMMARY OF FIVE DAYS OF VEEG: Five days of video EEG were normal. On Video EEG day 3 pm patient pressed event button 7 times. Patient felt her vision was shaky, while speaking to her  on FaceTime he reported she seemed off. Another instance that she felt a falling sensation. During these events behavioral testing was completed and she was able to appropriately participate with no alteration in awareness. During all 7 events there were no EEG changes to suggest seizure activity. No electroclinical seizures were recorded on the EEG. Video was reviewed intermittently by EEG technologist and physician for clinical seizures.      Dr. Calvillo did call Dr. Bob office on September 13, 2024 and we will try to reconnect if necessary.  Otherwise we will forward discharge summary to Dr. Bob office.     Thank you for the opportunity to participate in Ms. Banks's care.\"    Consultations:    None    Discharge Medications:    Discharge Medication List as of 9/11/2024 12:35 PM        START taking these medications    Details   lamoTRIgine (LAMICTAL) 25 MG tablet Week 1&2: lamotrigine 12.5 mg am, week 3&4: 25 mg am, week 5: 25 mg am and 25 mg pm. Week 6: 50 mg am and 25 mg pm, week 7: 50 mg am and 50 mg pm, week 8: 75 mg am and 50 mg pm. week 9: 75 mg am and 75 mg pm. Week 10: 100 mg am and 75 mg pm. Week 11: 100  mg am and 100 mg pm., Disp-240 tablet, R-0, E-Prescribe           CONTINUE these medications which have CHANGED    Details   lacosamide (VIMPAT) 50 MG TABS tablet Take 4 tablets (200 mg) by mouth 2 times daily., Disp-240 tablet, R-1, E-Prescribe           CONTINUE these medications which have NOT CHANGED    Details   busPIRone (BUSPAR) 15 MG tablet Take 15 mg by mouth 2 times daily., Historical      clonazePAM (KLONOPIN) 0.5 MG tablet Take 0.5 mg by mouth nightly as needed for sleep or anxiety., Historical      eszopiclone (LUNESTA) 3 MG tablet Take 3 mg by " mouth at bedtime., Historical      hydrOXYzine HCl (ATARAX) 25 MG tablet Take 25 mg by mouth every 8 hours as needed for anxiety. Pt takes 25mg AM, 50mg PM, Historical      levonorgestrel (MIRENA) 52 MG (20 mcg/day) IUD by Intrauterine route once.Historical      vilazodone (VIIBRYD) 40 MG TABS tablet Take 40 mg by mouth daily (with dinner)., Historical           STOP taking these medications       divalproex sodium delayed-release (DEPAKOTE) 250 MG DR tablet Comments:   Reason for Stopping:               Discharge physical examination:   Vitals:  B/P: 93/57, T: 98.3, P: 75, R: 16  General:  Adult, in NAD, cooperative  HEENT:  NC/AT  Cardiac:  RRR, no m/r/g  Chest:  CTAB  Abdomen:  S/NT/ND  Extremities:  No LE swelling.    Skin:  No rash or lesion.    Psych:  Mood pleasant, affect congruent  Neuro: Alert and oriented. Speech fluent. Hearing intact to normal conversation. PERRL, EOM's intact. Strong shoulder shrug bilaterally. Face symmetric, tongue midline. Strength 5/5 bilaterally. No drift or pronation. Intact FNF. Sensation intact to light touch bilaterally. Gait normal.     Discharge follow up and instructions:    1.  Diet:   as prior to admission   2.  Activity: State laws prohibit operating a motor vehicle following any seizure or other episode with loss of awareness, or inability to sit up (Varies by state, be aware and comply with the regulations applicable to you). State law may require the licensed  to report any future episode to the DMV . Avoid any activities that might lead to self-injury or injury of others following any event with impaired awareness or impaired motor control. Such activities include but are not limited to holding babies or young children at heights from which they might be injured if dropped, bathing infants or young children in situations in which they might drown without continuous interactive care by an adult who is fully capable at all times during the bath, operating power  cutting or other tools, handling firearms, exposure to heights from which you might fall, exposure to vessels with hot cooking oil or water, and swimming alone.  3.  Follow up:  [facility] nurse call in 2-3 days. Follow-up as scheduled with local neurologist, Dr. Bob as already scheduled 9/2024 and again in 3 months.     JI Rodriguez    Total time: 40 minutes was spent in the care of this patient. The patient agrees with the above mentioned plan of care. I answered all the patient's questions and addressed immediate concerns. More than 50% of time spent consisted of counseling and coordinating care, including discussion of the diagnostic significance of EEG findings, anti-seizure medication management, and planning for discharge home

## 2024-09-11 NOTE — PLAN OF CARE
Goal Outcome Evaluation:      Plan of Care Reviewed With: patient    Overall Patient Progress: no changeOverall Patient Progress: no change    Outcome Evaluation: No events, leads removed, plan for discharge tomorrow    Status: Admitted for vEEG monitoring for further characterization. Hx of POTs and PPD.   Vitals: VSS   Neuros: Intact   IV: PIV SL   Labs/Electrolytes: WNL   Resp/trach: LSC on RA   Diet: Regular   Bowel status: LBM 9/9, BS+  : Voiding spontaneously   Skin: 2 sore spots from EEG leads, bacitracin applied   Pain: Denies   Activity: SBA with GB  Plan/Updates this shift: Plan for discharge tomorrow, leads taken off and pt showered. Loading dose of Vimpat given and restarted on oral Vimpat. Continue with POC.

## 2024-09-11 NOTE — PLAN OF CARE
Discharge time/date: 9/11 1320  Walked or Wheelchair: Wheelchair   PIV removed: Yes  Reviewed AVS with patient: Yes  Medication due times added to AVS in EPIC: Yes  Verbalized understanding of discharge with teachback: Yes  Medications retrieved from pharmacy: No, picking them up at home pharmacy   Supplies sent home: NA  Belongings from security with patient: yes, medications returned

## 2024-09-11 NOTE — PLAN OF CARE
VSS on RA, soft BP at baseline.  Denied pain.  Neuros intact. VEEG leads removed yesterday per pt request; no events witnessed or reported.  PIV SL.  On a regular diet.  Voiding spontaneously.  Last BM 9/9.  Up SBA and GB.  Plan for discharge today.  Pt had flight booked back home to [place] at 1800.  Continue with POC.    Goal Outcome Evaluation:      Plan of Care Reviewed With: patient    Overall Patient Progress: no change    Outcome Evaluation: Discharge today

## 2024-09-13 NOTE — PROGRESS NOTES
DISCHARGE ATTENDING NOTE:     Mrs. Banks 34 year old, right-handed female with a history of POTS and seizure-like spells who is being admitted for video EEG monitoring for further characterization and change in antiseizure medications.      Currently as 2 types of spells:  Type 1: Smaller spells.May feel itchy prior. Sometimes has a feeling something bad is going to happen. This feeling may be 30 minutes to 2 hours prior. She will feel like she is tumbling through open air. Will have a whole body jerk intermittently throughout. She is fully aware. They always occur in evening. Happening 3-4 times a week, can cluster. After she is tired, sore and feels cognitively slow and has trouble talking. The next morning and into the day she has trouble waking up and when she does become aware, she can't move, can't talk and feels paralyzed. Because of this she has been incontinent on 3 occasions.      Type 2: Convulsive spells. She had no warning,  notes eyes roll back/flutter and she has whole body shaking. No incontinence or tongue biting. After she is confused, tired and has a headache. Has had 4 of these since 11/2022.     Type 3: She had one event in which she saw rainbow kaleidoscopes for over one hour in visual field.     Prior to this admission she was taking lacosamide 150 mg in the morning and 200 mg at night, divalproex  mg at night, Klonopin 0.5 mg as needed.  However she does take Klonopin most nights.  On this hospital admission we weaned her off of lacosamide and divalproex and she did not receive any Klonopin.  During 5 days of video EEG monitoring her EEG essentially remained normal and no electrographic seizures or epileptiform discharges were recorded.  On video EEG day 3 patient had several spells described below.  The spells had no EEG changes to suggest seizure activity.  My clinical impression is that the spells are not seizures nor are they focal unimpaired seizures with scalp EEG being  negative based on clinical presentation.  She did describe some vision changes and a sensation of having difficulty thinking.  It is possible she may have acephalic migraines.  It would be helpful also to evaluate for any underlying cardiac etiologies however this is less likely.  On this hospital admission we did not see any evidence for epilepsy.  She does have a history of convulsions and we did not record the specific spell type.  Based on her clinical history of her convulsions these may possibly represent generalized tonic-clonic seizures.  For this reason it is advisable that she continue on antiseizure medications.    She would like to get pregnant in the future, therefore it is advisable to stop divalproex and substitute with lamotrigine.  We have given her a schedule to make this transition.  In the hospital we did stop divalproex and started her on slow titration of lamotrigine for the outpatient setting.  Our goal is to titrate lamotrigine to 100 mg twice a day and she will follow-up with Dr. Bob.  She was able to tolerate lacosamide 200 mg twice a day with no concerning side effects.  In the past her lacosamide level was 15 and this may have been a peak level based on administration time of lacosamide and checking her blood concentration.  Considering she had no side effects on a level of 15 I recommend optimizing lacosamide.  It is important to highlight combination of lacosamide and lamotrigine may have more side effects of dizziness, nausea, vomiting, unstable gait.  She will have to be monitored for this in the outpatient setting.  Additionally we did review lamotrigine may be associated with Aguilera-Earnest rash, mood changes, and insomnia.  She understood these side effects and is agreeable to starting lamotrigine.  Teratogenicity associated with lamotrigine was reviewed with patient in great detail.  Additionally lacosamide is reported to have low teratogenicity, however this data is not  obtained from the large pregnancy databases.    Patient requested to leave by video-EEG day 5 due to personal responsibilities at home.  She was loaded with IV lacosamide and started on low-dose of lamotrigine.  She was encouraged to take riboflavin 400 mg/day and consider taking magnesium 400 mg/day if she does have a migraine variant.  She can discuss this further with Dr. Bob.  She should complete a Holter monitor at home with local physicians.  Also I have encouraged her to work with her therapist more frequently to address any underlying stressors in the event some of her spells are nonepileptic events.      SUMMARY OF FIVE DAYS OF VEEG: Five days of video EEG were normal. On Video EEG day 3 pm patient pressed event button 7 times. Patient felt her vision was shaky, while speaking to her  on FaceTime he reported she seemed off. Another instance that she felt a falling sensation. During these events behavioral testing was completed and she was able to appropriately participate with no alteration in awareness. During all 7 events there were no EEG changes to suggest seizure activity. No electroclinical seizures were recorded on the EEG. Video was reviewed intermittently by EEG technologist and physician for clinical seizures.     Dr. Calvillo did call Dr. Bob office on September 13, 2024 and we will try to reconnect if necessary.  Otherwise we will forward discharge summary to Dr. Bob office.    Thank you for the opportunity to participate in Ms. Banks's care.    Milana Calvillo MD

## 2024-09-16 ENCOUNTER — VIRTUAL VISIT (OUTPATIENT)
Dept: NEUROLOGY | Facility: CLINIC | Age: 34
End: 2024-09-16
Payer: COMMERCIAL

## 2024-09-16 DIAGNOSIS — R56.9 SEIZURE-LIKE ACTIVITY (H): Primary | ICD-10-CM

## 2024-09-16 NOTE — PROGRESS NOTES
[facility]:  Care Coordination Note     SITUATION   Winnie Banks is a 34 year old female who is receiving support for:  Clinic Care Coordination - [facility]      BACKGROUND     Discharged from [facility] on 9/11/24    ASSESSMENT     Patient reports that she was very disappointed with the provider who she saw in the hospital.  She felt like she did not get any answers to her questions and felt like the whole time she was being gas lite, and being told it was all in her head.  Writer did review with her PNES and the treatment for it.  Patient reports that she follows with both a psychiatrist and a therapist in [place] and that her mental health is very important to her and probably the best it has ever been.  We discussed the Trav Watkins workbook and suggested that she check in with her therapist about that.  She was thankful for the recommendation as she did not feel like there was much else they recommended on how to treat these.        PLAN          Nursing Interventions:  PNES education     Follow-up plan:  None, patient is continuing care locally.  Encouraged to follow up with ANGELIKA should she need further care        Joy Banks RN

## 2024-10-06 ENCOUNTER — HEALTH MAINTENANCE LETTER (OUTPATIENT)
Age: 34
End: 2024-10-06